# Patient Record
Sex: FEMALE | Race: OTHER | NOT HISPANIC OR LATINO | Employment: FULL TIME | URBAN - METROPOLITAN AREA
[De-identification: names, ages, dates, MRNs, and addresses within clinical notes are randomized per-mention and may not be internally consistent; named-entity substitution may affect disease eponyms.]

---

## 2023-05-01 ENCOUNTER — NURSE TRIAGE (OUTPATIENT)
Dept: ADMINISTRATIVE | Facility: CLINIC | Age: 28
End: 2023-05-01

## 2023-05-01 NOTE — TELEPHONE ENCOUNTER
Sore throat, cough, and ear pain. Pt is 38 weeks pregnant. Pt stated she is going to [facility] and wanted to know what could they prescribe her if it was strep, the flu or something like that. Informed pt that the [facility] are capable of prescribing medications for pregnant pts they have OB md on call and they sometimes send pts to the [facility]. Pt made aware that I as a triage nurse could not tell Md what to prescribe her. Pt verbalized understanding.   Reason for Disposition   General information question, no triage required and triager able to answer question    Protocols used: Information Only Call-A-AH